# Patient Record
Sex: FEMALE | Race: WHITE | Employment: OTHER | URBAN - METROPOLITAN AREA
[De-identification: names, ages, dates, MRNs, and addresses within clinical notes are randomized per-mention and may not be internally consistent; named-entity substitution may affect disease eponyms.]

---

## 2017-07-28 ENCOUNTER — HOSPITAL ENCOUNTER (OUTPATIENT)
Dept: LAB | Age: 52
Discharge: HOME | End: 2017-07-28
Payer: MEDICARE

## 2017-07-28 DIAGNOSIS — R30.0 DYSURIA: ICD-10-CM

## 2017-07-28 PROCEDURE — 87086 URINE CULTURE/COLONY COUNT: CPT | Performed by: NURSE PRACTITIONER

## 2017-07-31 LAB
BACTERIA SPEC CULT: NORMAL
SERVICE CMNT-IMP: NORMAL

## 2017-08-01 NOTE — PROGRESS NOTES
Pt informed that urine cultures were negative. Current Outpatient Prescriptions on File Prior to Encounter:  baclofen (LIORESAL) 10 mg tablet, , Disp: , Rfl: 0  carisoprodol (SOMA) 350 mg tablet, , Disp: , Rfl: 0  diphenoxylate-atropine (LOMOTIL) 2.5-0.025 mg per tablet, , Disp: , Rfl: 0  gabapentin (NEURONTIN) 300 mg capsule, , Disp: , Rfl: 1  HYDROcodone-acetaminophen (NORCO)  mg tablet, , Disp: , Rfl: 0  hyoscyamine (ANASPAZ, LEVSIN) 0.125 mg tablet, , Disp: , Rfl: 0  lisinopril (PRINIVIL, ZESTRIL) 20 mg tablet, , Disp: , Rfl: 1  LORazepam (ATIVAN) 1 mg tablet, , Disp: , Rfl: 0  metoprolol succinate (TOPROL-XL) 100 mg tablet, , Disp: , Rfl: 0  montelukast (SINGULAIR) 10 mg tablet, , Disp: , Rfl: 0  oxyCODONE IR (ROXICODONE) 10 mg tab immediate release tablet, , Disp: , Rfl:   potassium chloride SR (KLOR-CON 10) 10 mEq tablet, , Disp: , Rfl: 0  primidone (MYSOLINE) 250 mg tablet, , Disp: , Rfl: 0  simvastatin (ZOCOR) 20 mg tablet, , Disp: , Rfl: 0  zolpidem (AMBIEN) 10 mg tablet, , Disp: , Rfl: 0    No current facility-administered medications on file prior to encounter.  Gómez Sosa NP

## 2018-12-19 ENCOUNTER — HOSPITAL ENCOUNTER (OUTPATIENT)
Dept: LAB | Age: 53
Discharge: HOME | End: 2018-12-19

## 2018-12-19 PROCEDURE — 88305 TISSUE EXAM BY PATHOLOGIST: CPT

## 2018-12-19 PROCEDURE — 88312 SPECIAL STAINS GROUP 1: CPT

## 2019-08-19 PROCEDURE — 88305 TISSUE EXAM BY PATHOLOGIST: CPT

## 2019-08-20 ENCOUNTER — HOSPITAL ENCOUNTER (OUTPATIENT)
Dept: LAB | Age: 54
Discharge: HOME | End: 2019-08-20